# Patient Record
Sex: FEMALE | URBAN - METROPOLITAN AREA
[De-identification: names, ages, dates, MRNs, and addresses within clinical notes are randomized per-mention and may not be internally consistent; named-entity substitution may affect disease eponyms.]

---

## 2022-03-30 ENCOUNTER — APPOINTMENT (RX ONLY)
Dept: URBAN - METROPOLITAN AREA CLINIC 55 | Facility: CLINIC | Age: 42
Setting detail: DERMATOLOGY
End: 2022-03-30

## 2022-03-30 DIAGNOSIS — Z41.9 ENCOUNTER FOR PROCEDURE FOR PURPOSES OTHER THAN REMEDYING HEALTH STATE, UNSPECIFIED: ICD-10-CM

## 2022-03-30 PROCEDURE — ? COSMETIC CONSULTATION: SCLEROTHERAPY

## 2022-04-14 ENCOUNTER — APPOINTMENT (RX ONLY)
Dept: URBAN - METROPOLITAN AREA CLINIC 55 | Facility: CLINIC | Age: 42
Setting detail: DERMATOLOGY
End: 2022-04-14

## 2022-04-14 DIAGNOSIS — Z41.9 ENCOUNTER FOR PROCEDURE FOR PURPOSES OTHER THAN REMEDYING HEALTH STATE, UNSPECIFIED: ICD-10-CM

## 2022-04-14 PROCEDURE — ? SCLEROTHERAPY (COSMETIC)

## 2022-04-14 ASSESSMENT — LOCATION ZONE DERM: LOCATION ZONE: LEG

## 2022-04-14 ASSESSMENT — LOCATION DETAILED DESCRIPTION DERM: LOCATION DETAILED: LEFT ANTERIOR DISTAL THIGH

## 2022-04-14 ASSESSMENT — LOCATION SIMPLE DESCRIPTION DERM: LOCATION SIMPLE: LEFT THIGH

## 2022-04-14 NOTE — PROCEDURE: SCLEROTHERAPY (COSMETIC)
Detail Level: Detailed
Sclerosant Volume (Cc): 6
Price (Use Numbers Only, No Special Characters Or $): 022
Treatment Number (Optional): 1
Sclerosant Volume (Cc): 10
Disposition: Compression stockings applied by patient post procedure.
Consent was obtained with risks, benefits, and alternatives discussed for the above procedures.  Photographs were taken.
Post-Care Instructions: Compression for 7 days post procedure.. Compliance with compression helps to prevent blood clots and minimizes pain, swelling, bruising, skin discoloration (staining) and the recurrence of vessels. compression is needed only during waking hours.  However, if your leg feels better with compression at night, then you may continue compression at night as  needed. For 2 days after treatment: Avoid aerobic exercise, weight training, and all other types of exertion. Do not get a tan for one month after sclerotherapy. Tanning increases your risk of skin discoloration. After 24 hours, you may shower or bathe in tepid water. Avoid immersion in hot tubs.For pain or discomfort you may take acetaminophen. Possible side effects following sclerotherapy  After sclerotherapy, mild swelling is expected. The injection sites may also become bruised. You may also experience one or more of the following side effects, which almost always go away within one to four months:       Darkening of the injected veins, brownish staining of the skin, small clotted vessels under the surface of the skin that you can feel . If you experience any of the following: treated areas become increasingly sore, tender, red or warm or significant swelling or pain in the leg please call [facility].

## 2022-10-21 ENCOUNTER — APPOINTMENT (RX ONLY)
Dept: URBAN - METROPOLITAN AREA CLINIC 55 | Facility: CLINIC | Age: 42
Setting detail: DERMATOLOGY
End: 2022-10-21

## 2022-10-21 DIAGNOSIS — Z41.9 ENCOUNTER FOR PROCEDURE FOR PURPOSES OTHER THAN REMEDYING HEALTH STATE, UNSPECIFIED: ICD-10-CM

## 2023-04-13 ENCOUNTER — APPOINTMENT (RX ONLY)
Dept: URBAN - METROPOLITAN AREA CLINIC 55 | Facility: CLINIC | Age: 43
Setting detail: DERMATOLOGY
End: 2023-04-13

## 2023-04-13 DIAGNOSIS — Z41.9 ENCOUNTER FOR PROCEDURE FOR PURPOSES OTHER THAN REMEDYING HEALTH STATE, UNSPECIFIED: ICD-10-CM

## 2023-04-13 PROCEDURE — ? INVENTORY

## 2023-04-13 PROCEDURE — ? COSMETIC CONSULTATION: GENERAL

## 2023-04-13 PROCEDURE — ? IN-HOUSE DISPENSING PHARMACY

## 2023-04-13 NOTE — PROCEDURE: IN-HOUSE DISPENSING PHARMACY
Product 51 Refills: 0
Product 20 Unit Type: mg
Product 1 Amount/Unit (Numbers Only): 30
Product 1 Application Directions: Apply nightly or as directed. Use SPF daily.
Product 4 Application Directions: Apply nightly or as directed.
Product 2 Unit Type: ml
Name Of Product 4: Hydroquinone 8% Emulsion
Name Of Product 6: Rosacea Triple Combination Gel
Name Of Product 2: Dapsone / Spironolactone Gel
Product 5 Refills: 11
Product 7 Application Directions: Apply only as directed
Name Of Product 5: Hydrating Tretinoin 0.025% Cream
Product 7 Refills: 2
Name Of Product 1: Anti-Aging Brightening Cream
Detail Level: Zone
Render Refills If Set To 0: Yes
Product 4 Units Dispensed: 1
Name Of Product 3: Acne Triple Combination Gel
Send Charges To Patient Encounter: No
Name Of Product 7: Lidocaine 23% / Tetracaine 7% Cream

## 2023-05-09 ENCOUNTER — APPOINTMENT (RX ONLY)
Dept: URBAN - METROPOLITAN AREA CLINIC 55 | Facility: CLINIC | Age: 43
Setting detail: DERMATOLOGY
End: 2023-05-09

## 2023-05-09 DIAGNOSIS — Z41.9 ENCOUNTER FOR PROCEDURE FOR PURPOSES OTHER THAN REMEDYING HEALTH STATE, UNSPECIFIED: ICD-10-CM

## 2023-05-09 PROCEDURE — ? INVENTORY

## 2023-11-17 ENCOUNTER — APPOINTMENT (RX ONLY)
Dept: URBAN - METROPOLITAN AREA CLINIC 55 | Facility: CLINIC | Age: 43
Setting detail: DERMATOLOGY
End: 2023-11-17

## 2023-11-17 DIAGNOSIS — Z41.9 ENCOUNTER FOR PROCEDURE FOR PURPOSES OTHER THAN REMEDYING HEALTH STATE, UNSPECIFIED: ICD-10-CM

## 2023-11-17 PROCEDURE — ? IN-HOUSE DISPENSING PHARMACY

## 2023-11-17 PROCEDURE — ? INVENTORY

## 2023-11-17 NOTE — PROCEDURE: IN-HOUSE DISPENSING PHARMACY
Product 75 Units Dispensed: 0
Product 41 Unit Type: mg
Product 7 Application Directions: Apply only as directed
Product 5 Unit Type: ml
Product 5 Amount/Unit (Numbers Only): 30
Product 2 Refills: 11
Render Refills If Set To 0: Yes
Product 4 Refills: 2
Name Of Product 3: Acne Triple Combination Gel
Name Of Product 2: Dapsone / Spironolactone Gel
Name Of Product 4: Hydroquinone 8% Emulsion
Product 1 Application Directions: Apply nightly or as directed. Use SPF daily.
Product 5 Refills: 10
Send Charges To Patient Encounter: No
Product 5 Units Dispensed: 1
Product 2 Application Directions: Apply nightly or as directed.
Name Of Product 7: Lidocaine 23% / Tetracaine 7% Cream
Name Of Product 6: Rosacea Triple Combination Gel
Name Of Product 1: Anti-Aging Brightening Cream
Name Of Product 5: Hydrating Tretinoin 0.025% Cream
Detail Level: Zone

## 2024-06-05 ENCOUNTER — RX ONLY (OUTPATIENT)
Age: 44
Setting detail: RX ONLY
End: 2024-06-05

## 2024-06-05 ENCOUNTER — APPOINTMENT (RX ONLY)
Dept: URBAN - METROPOLITAN AREA CLINIC 55 | Facility: CLINIC | Age: 44
Setting detail: DERMATOLOGY
End: 2024-06-05

## 2024-06-05 DIAGNOSIS — Z41.9 ENCOUNTER FOR PROCEDURE FOR PURPOSES OTHER THAN REMEDYING HEALTH STATE, UNSPECIFIED: ICD-10-CM

## 2024-06-05 PROCEDURE — ? IN-HOUSE DISPENSING PHARMACY

## 2024-06-05 PROCEDURE — ? INVENTORY

## 2024-06-05 NOTE — PROCEDURE: IN-HOUSE DISPENSING PHARMACY
Product 66 Unit Type: mg
Product 21 Units Dispensed: 0
Product 7 Amount/Unit (Numbers Only): 30
Product 7 Refills: 2
Product 3 Refills: 11
Name Of Product 4: Hydroquinone 8% Emulsion
Product 1 Application Directions: Apply nightly or as directed. Use SPF daily.
Detail Level: Zone
Product 1 Unit Type: ml
Name Of Product 1: Anti-Aging Brightening Cream
Product 2 Application Directions: Apply nightly or as directed.
Render Refills If Set To 0: Yes
Product 4 Units Dispensed: 1
Name Of Product 3: Acne Triple Combination Gel
Name Of Product 5: Hydrating Tretinoin 0.025% Cream
Product 7 Application Directions: Apply only as directed
Send Charges To Patient Encounter: No
Name Of Product 6: Rosacea Triple Combination Gel
Name Of Product 2: Dapsone / Spironolactone Gel
Name Of Product 7: Lidocaine 23% / Tetracaine 7% Cream

## 2024-07-17 ENCOUNTER — APPOINTMENT (RX ONLY)
Dept: URBAN - METROPOLITAN AREA CLINIC 55 | Facility: CLINIC | Age: 44
Setting detail: DERMATOLOGY
End: 2024-07-17

## 2024-07-17 DIAGNOSIS — Z41.9 ENCOUNTER FOR PROCEDURE FOR PURPOSES OTHER THAN REMEDYING HEALTH STATE, UNSPECIFIED: ICD-10-CM

## 2024-07-17 PROCEDURE — ? INVENTORY

## 2024-07-17 PROCEDURE — ? FRAXEL

## 2024-07-17 NOTE — PROCEDURE: FRAXEL
Small Metal Eye Shield Text: The ocular mucosa was anesthetized with tetracaine. Once adequate anesthesia was optained, small metal eye shields were inserted and remained in place until the procedure was completed.
Treatment Level: 1
Medium Plastic Eye Shield Text: The ocular mucosa was anesthetized with tetracaine. Once adequate anesthesia was optained, medium plastic eye shields were inserted and remained in place until the procedure was completed.
Indication: resurfacing
Consent obtained, risks reviewed.
Medium Metal Eye Shield Text: The ocular mucosa was anesthetized with tetracaine. Once adequate anesthesia was optained, medium metal eye shields were inserted and remained in place until the procedure was completed.
Large Plastic Eye Shield Text: The ocular mucosa was anesthetized with tetracaine. Once adequate anesthesia was optained, large plastic eye shields were inserted and remained in place until the procedure was completed.
Energy(Mj/Cm2): 20
Add Post-Care Below To The Note: No
Treatment Level: 3
Location: full face
Large Metal Eye Shield Text: The ocular mucosa was anesthetized with tetracaine. Once adequate anesthesia was optained, large metal eye shields were inserted and remained in place until the procedure was completed.
External Cooling Fan Speed: 5
Energy(Mj/Cm2): 40
Post-Care Instructions: Topical anesthetic removed with dry gauze. Skin cleansed with gentle cleanser and prepped with Hibiclens. \\nAlastin Regenerating Skin Nectar and physical sunblock applied post treatment. Ice packs sent home with patient. \\nI reviewed with the patient in detail post-care instructions. Patient should avoid sun until area fully healed.
Number Of Passes: 4
Treatment Level: 6
Small Plastic Eye Shield Text: The ocular mucosa was anesthetized with tetracaine. Once adequate anesthesia was optained, small plastic eye shields were inserted and remained in place until the procedure was completed.
Number Of Passes: 8
Detail Level: Zone
Anesthesia Type: 1% lidocaine with epinephrine
Wavelength: 1550nm

## 2024-07-31 ENCOUNTER — APPOINTMENT (RX ONLY)
Dept: URBAN - METROPOLITAN AREA CLINIC 55 | Facility: CLINIC | Age: 44
Setting detail: DERMATOLOGY
End: 2024-07-31

## 2024-07-31 DIAGNOSIS — Z41.9 ENCOUNTER FOR PROCEDURE FOR PURPOSES OTHER THAN REMEDYING HEALTH STATE, UNSPECIFIED: ICD-10-CM

## 2024-07-31 PROCEDURE — ? INVENTORY

## 2024-08-14 ENCOUNTER — APPOINTMENT (RX ONLY)
Dept: URBAN - METROPOLITAN AREA CLINIC 55 | Facility: CLINIC | Age: 44
Setting detail: DERMATOLOGY
End: 2024-08-14

## 2024-08-14 DIAGNOSIS — Z41.9 ENCOUNTER FOR PROCEDURE FOR PURPOSES OTHER THAN REMEDYING HEALTH STATE, UNSPECIFIED: ICD-10-CM

## 2024-08-14 PROCEDURE — ? BOTOX

## 2024-08-14 PROCEDURE — ? COSMETIC CONSULTATION: GENERAL

## 2024-08-14 PROCEDURE — ? INVENTORY

## 2024-08-14 NOTE — PROCEDURE: BOTOX
Glabellar Complex Units: 0
Show Topical Anesthesia: Yes
Additional Area 2 Location: Nasalis
Periorbital Skin Units: 16
Show Mentalis Units: No
Detail Level: Detailed
Additional Area 1 Location: Lip
Dilution (U/0.1 Cc): 4
Incrementing Botox Units: By 0.5 Units
Consent obtained. Risks include but not limited to lid/brow ptosis, bruising, swelling, diplopia, temporary effect, incomplete chemical denervation.
Post-Care Instructions: Patient instructed to not lie down for 4 hours and limit physical activity for 24 hours.
Additional Area 3 Location: gummy smile
Show Inventory Tab: Hide

## 2024-11-18 ENCOUNTER — APPOINTMENT (RX ONLY)
Dept: URBAN - METROPOLITAN AREA CLINIC 55 | Facility: CLINIC | Age: 44
Setting detail: DERMATOLOGY
End: 2024-11-18

## 2024-11-18 DIAGNOSIS — Z41.9 ENCOUNTER FOR PROCEDURE FOR PURPOSES OTHER THAN REMEDYING HEALTH STATE, UNSPECIFIED: ICD-10-CM

## 2024-11-18 PROCEDURE — ? INVENTORY

## 2024-11-18 PROCEDURE — ? IN-HOUSE DISPENSING PHARMACY

## 2024-11-18 NOTE — PROCEDURE: IN-HOUSE DISPENSING PHARMACY
Product 66 Refills: 0
Product 19 Unit Type: mg
Name Of Product 4: Hydroquinone 8% Emulsion
Product 2 Unit Type: ml
Product 5 Application Directions: Apply nightly or as directed. Use SPF daily.
Product 2 Application Directions: Apply nightly or as directed.
Product 4 Amount/Unit (Numbers Only): 30
Product 5 Units Dispensed: 1
Name Of Product 2: Dapsone / Spironolactone Gel
Product 7 Refills: 2
Name Of Product 1: Anti-Aging Brightening Cream
Product 7 Application Directions: Apply only as directed
Name Of Product 5: Hydrating Tretinoin 0.025% Cream
Detail Level: Zone
Send Charges To Patient Encounter: No
Render Refills If Set To 0: Yes
Product 6 Refills: 11
Name Of Product 7: Lidocaine 23% / Tetracaine 7% Cream
Name Of Product 3: Acne Triple Combination Gel
Name Of Product 6: Rosacea Triple Combination Gel

## 2025-08-19 ENCOUNTER — APPOINTMENT (OUTPATIENT)
Dept: URBAN - METROPOLITAN AREA CLINIC 55 | Facility: CLINIC | Age: 45
Setting detail: DERMATOLOGY
End: 2025-08-19

## 2025-08-19 DIAGNOSIS — Z41.9 ENCOUNTER FOR PROCEDURE FOR PURPOSES OTHER THAN REMEDYING HEALTH STATE, UNSPECIFIED: ICD-10-CM

## 2025-08-19 PROCEDURE — ? BOTOX

## 2025-08-19 PROCEDURE — ? INVENTORY
